# Patient Record
Sex: MALE | Race: WHITE | NOT HISPANIC OR LATINO | Employment: OTHER | URBAN - NONMETROPOLITAN AREA
[De-identification: names, ages, dates, MRNs, and addresses within clinical notes are randomized per-mention and may not be internally consistent; named-entity substitution may affect disease eponyms.]

---

## 2017-12-22 DIAGNOSIS — M25.561 PAIN IN BOTH KNEES, UNSPECIFIED CHRONICITY: Primary | ICD-10-CM

## 2017-12-22 DIAGNOSIS — M25.562 PAIN IN BOTH KNEES, UNSPECIFIED CHRONICITY: Primary | ICD-10-CM

## 2017-12-26 ENCOUNTER — OFFICE VISIT (OUTPATIENT)
Dept: ORTHOPEDIC SURGERY | Facility: CLINIC | Age: 77
End: 2017-12-26

## 2017-12-26 VITALS — WEIGHT: 214 LBS | HEIGHT: 60 IN | BODY MASS INDEX: 42.01 KG/M2

## 2017-12-26 DIAGNOSIS — M25.561 PAIN IN BOTH KNEES, UNSPECIFIED CHRONICITY: ICD-10-CM

## 2017-12-26 DIAGNOSIS — M25.562 PAIN IN BOTH KNEES, UNSPECIFIED CHRONICITY: ICD-10-CM

## 2017-12-26 DIAGNOSIS — M17.0 PRIMARY OSTEOARTHRITIS OF BOTH KNEES: Primary | ICD-10-CM

## 2017-12-26 PROCEDURE — 99203 OFFICE O/P NEW LOW 30 MIN: CPT | Performed by: NURSE PRACTITIONER

## 2017-12-26 PROCEDURE — 20610 DRAIN/INJ JOINT/BURSA W/O US: CPT | Performed by: NURSE PRACTITIONER

## 2017-12-26 RX ORDER — ASPIRIN 81 MG/1
81 TABLET ORAL DAILY
COMMUNITY

## 2017-12-26 RX ORDER — TRAZODONE HYDROCHLORIDE 50 MG/1
50 TABLET ORAL NIGHTLY
COMMUNITY
End: 2019-10-11 | Stop reason: ALTCHOICE

## 2017-12-26 RX ORDER — PRAVASTATIN SODIUM 40 MG
40 TABLET ORAL DAILY
COMMUNITY
End: 2018-10-10

## 2017-12-26 RX ORDER — AMIODARONE HYDROCHLORIDE 200 MG/1
200 TABLET ORAL DAILY
COMMUNITY

## 2017-12-26 RX ORDER — FUROSEMIDE 40 MG/1
40 TABLET ORAL 2 TIMES DAILY
COMMUNITY

## 2017-12-26 RX ORDER — MEMANTINE HYDROCHLORIDE 10 MG/1
10 TABLET ORAL 2 TIMES DAILY
COMMUNITY

## 2017-12-26 RX ORDER — POTASSIUM CHLORIDE 1.5 G/1.77G
20 POWDER, FOR SOLUTION ORAL 2 TIMES DAILY
COMMUNITY
End: 2018-10-10 | Stop reason: SDUPTHER

## 2017-12-26 RX ORDER — CARVEDILOL 12.5 MG/1
12.5 TABLET ORAL 2 TIMES DAILY WITH MEALS
COMMUNITY
End: 2018-10-10 | Stop reason: ALTCHOICE

## 2017-12-26 RX ORDER — BENAZEPRIL HYDROCHLORIDE 10 MG/1
10 TABLET ORAL DAILY
COMMUNITY

## 2017-12-26 RX ADMIN — TRIAMCINOLONE ACETONIDE 40 MG: 40 INJECTION, SUSPENSION INTRA-ARTICULAR; INTRAMUSCULAR at 15:14

## 2017-12-26 NOTE — PROGRESS NOTES
Duane Arnold is a 77 y.o. male   Primary provider:  Provider Not In System       Chief Complaint   Patient presents with   • Left Knee - Pain     Xray today   • Right Knee - Pain     Xray today       HISTORY OF PRESENT ILLNESS:    Pain   This is a chronic problem. Episode onset: years ago. The problem occurs constantly. Associated symptoms comments: aching. The symptoms are aggravated by walking and standing. Treatments tried: Cortisone shots,  Dr Christensen in [place]. The treatment provided mild relief.      Patient trying to establish care here, because his wife will be coming to [place] for rehab to be close to family        CONCURRENT MEDICAL HISTORY:    No past medical history on file.    Allergies   Allergen Reactions   • Aricept [Donepezil Hcl]          Current Outpatient Prescriptions:   •  amiodarone (PACERONE) 200 MG tablet, Take 200 mg by mouth Daily., Disp: , Rfl:   •  aspirin 81 MG EC tablet, Take 81 mg by mouth Daily., Disp: , Rfl:   •  benazepril (LOTENSIN) 10 MG tablet, Take 10 mg by mouth Daily., Disp: , Rfl:   •  carvedilol (COREG) 12.5 MG tablet, Take 12.5 mg by mouth 2 (Two) Times a Day With Meals., Disp: , Rfl:   •  furosemide (LASIX) 40 MG tablet, Take 40 mg by mouth 2 (Two) Times a Day., Disp: , Rfl:   •  memantine (NAMENDA) 10 MG tablet, Take 10 mg by mouth 2 (Two) Times a Day., Disp: , Rfl:   •  potassium chloride (KLOR-CON) 20 MEQ packet, Take 20 mEq by mouth 2 (Two) Times a Day., Disp: , Rfl:   •  pravastatin (PRAVACHOL) 40 MG tablet, Take 40 mg by mouth Daily., Disp: , Rfl:   •  traZODone (DESYREL) 50 MG tablet, Take 50 mg by mouth Every Night., Disp: , Rfl:     No past surgical history on file.    No family history on file.    Social History     Social History   • Marital status:      Spouse name: N/A   • Number of children: N/A   • Years of education: N/A     Occupational History   • Not on file.     Social History Main Topics   • Smoking status: Not on file   • Smokeless  "tobacco: Not on file   • Alcohol use Not on file   • Drug use: Not on file   • Sexual activity: Not on file     Other Topics Concern   • Not on file     Social History Narrative        Review of Systems   Constitutional: Negative.    HENT: Negative.    Eyes: Negative.    Respiratory: Negative.    Cardiovascular: Negative.    Gastrointestinal: Negative.    Endocrine: Negative.    Genitourinary: Negative.    Musculoskeletal: Negative.    Skin: Negative.    Allergic/Immunologic: Negative.    Neurological: Negative.    Hematological: Negative.    Psychiatric/Behavioral: Negative.        PHYSICAL EXAMINATION:       Ht 70 cm (27.56\")  Wt 97.1 kg (214 lb)  .1 kg/m2    Physical Exam   Constitutional: He is oriented to person, place, and time. Vital signs are normal. He appears well-developed and well-nourished. He is cooperative.   HENT:   Head: Normocephalic and atraumatic.   Neck: Trachea normal and phonation normal.   Pulmonary/Chest: Effort normal. No respiratory distress.   Abdominal: Soft. Normal appearance. He exhibits no distension.   Musculoskeletal:        Right knee: He exhibits no effusion.        Left knee: He exhibits no effusion.   Neurological: He is alert and oriented to person, place, and time. GCS eye subscore is 4. GCS verbal subscore is 5. GCS motor subscore is 6.   Skin: Skin is warm, dry and intact.   Psychiatric: He has a normal mood and affect. His speech is normal and behavior is normal. Judgment and thought content normal. Cognition and memory are normal.   Vitals reviewed.      GAIT:     []  Normal  [x]  Antalgic    Assistive device: []  None  []  Walker     []  Crutches  []  Cane     []  Wheelchair  []  Stretcher    Right Knee Exam     Tenderness   The patient is experiencing tenderness in the medial joint line.    Range of Motion   Extension: normal   Flexion: abnormal     Other   Erythema: absent  Scars: present  Sensation: normal  Pulse: present  Swelling: mild  Other tests: no " effusion present      Left Knee Exam     Tenderness   The patient is experiencing tenderness in the medial joint line.    Range of Motion   Extension: normal   Flexion: abnormal     Other   Erythema: absent  Scars: present  Sensation: normal  Pulse: present  Swelling: mild  Effusion: no effusion present                  Xr Knee Bilateral Ap Standing    Result Date: 12/26/2017  Narrative: Standing AP of both knees with lateral views of both reveals bilateral end-stage tricompartmental degenerative changes above knees without any acute radiological abnormality noted. 12/26/17 at 2:55 PM by SHARLA Montague    Xr Knee 1 Or 2 View Bilateral    Result Date: 12/26/2017  Narrative: Standing AP of both knees with lateral views of both reveals bilateral end-stage tricompartmental degenerative changes above knees without any acute radiological abnormality noted. 12/26/17 at 2:55 PM by SHARLA Montague           ASSESSMENT:    Diagnoses and all orders for this visit:    Primary osteoarthritis of both knees    Pain in both knees, unspecified chronicity  -     Large Joint Arthrocentesis  -     Large Joint Arthrocentesis    Other orders  -     furosemide (LASIX) 40 MG tablet; Take 40 mg by mouth 2 (Two) Times a Day.  -     potassium chloride (KLOR-CON) 20 MEQ packet; Take 20 mEq by mouth 2 (Two) Times a Day.  -     benazepril (LOTENSIN) 10 MG tablet; Take 10 mg by mouth Daily.  -     memantine (NAMENDA) 10 MG tablet; Take 10 mg by mouth 2 (Two) Times a Day.  -     pravastatin (PRAVACHOL) 40 MG tablet; Take 40 mg by mouth Daily.  -     carvedilol (COREG) 12.5 MG tablet; Take 12.5 mg by mouth 2 (Two) Times a Day With Meals.  -     amiodarone (PACERONE) 200 MG tablet; Take 200 mg by mouth Daily.  -     aspirin 81 MG EC tablet; Take 81 mg by mouth Daily.  -     traZODone (DESYREL) 50 MG tablet; Take 50 mg by mouth Every Night.      Large Joint Arthrocentesis  Date/Time: 12/26/2017 3:14 PM  Consent given by:  patient  Site marked: site marked  Timeout: Immediately prior to procedure a time out was called to verify the correct patient, procedure, equipment, support staff and site/side marked as required   Supporting Documentation  Indications: pain   Procedure Details  Location: knee - L knee  Preparation: Patient was prepped and draped in the usual sterile fashion  Needle size: 22 G  Approach: anteromedial  Medications administered: 40 mg triamcinolone acetonide 40 MG/ML (2cc  2% carbocaine  NDC 92589-840-64)  Patient tolerance: patient tolerated the procedure well with no immediate complications    Large Joint Arthrocentesis  Date/Time: 12/26/2017 3:14 PM  Consent given by: patient  Site marked: site marked  Timeout: Immediately prior to procedure a time out was called to verify the correct patient, procedure, equipment, support staff and site/side marked as required   Supporting Documentation  Indications: pain   Procedure Details  Location: knee - R knee  Preparation: Patient was prepped and draped in the usual sterile fashion  Needle size: 22 G  Approach: anteromedial  Medications administered: 40 mg triamcinolone acetonide 40 MG/ML (2cc  2% carbocaine  NDC 62033-373-30)  Patient tolerance: patient tolerated the procedure well with no immediate complications            PLAN  Discussed the findings and the x-rays with the daughter and the patient in detail.  We'll recommend intra-articular injections of steroids today continued use of the cane for pain exacerbations and a course of Visco supplementation follow-up in the next couple weeks.  No Follow-up on file.    Ananda Villarreal, SHARLA

## 2017-12-27 RX ORDER — TRIAMCINOLONE ACETONIDE 40 MG/ML
40 INJECTION, SUSPENSION INTRA-ARTICULAR; INTRAMUSCULAR
Status: COMPLETED | OUTPATIENT
Start: 2017-12-26 | End: 2017-12-26

## 2018-01-05 ENCOUNTER — CLINICAL SUPPORT (OUTPATIENT)
Dept: ORTHOPEDIC SURGERY | Facility: CLINIC | Age: 78
End: 2018-01-05

## 2018-01-05 VITALS — HEIGHT: 70 IN | WEIGHT: 214 LBS | BODY MASS INDEX: 30.64 KG/M2

## 2018-01-05 DIAGNOSIS — M25.562 ACUTE PAIN OF BOTH KNEES: ICD-10-CM

## 2018-01-05 DIAGNOSIS — M17.0 PRIMARY OSTEOARTHRITIS OF BOTH KNEES: Primary | ICD-10-CM

## 2018-01-05 DIAGNOSIS — M17.0 BILATERAL PRIMARY OSTEOARTHRITIS OF KNEE: ICD-10-CM

## 2018-01-05 DIAGNOSIS — M25.561 ACUTE PAIN OF BOTH KNEES: ICD-10-CM

## 2018-01-05 PROCEDURE — 20610 DRAIN/INJ JOINT/BURSA W/O US: CPT | Performed by: NURSE PRACTITIONER

## 2018-01-05 NOTE — PROGRESS NOTES
"Knee Joint Injection      Patient: Duane Arnold    YOB: 1940    Chief Complaint   Patient presents with   • Left Knee - Injections   • Right Knee - Injections       History of Present Illness:  Patient is here for ORTHOVISC #1 injections- bilateral knee.  No new complaints.    Physical Exam: 78 y.o. male  General Appearance:    Alert, cooperative, in no acute distress                   Vitals:    01/05/18 0842   Weight: 97.1 kg (214 lb)   Height: 177.8 cm (70\")   PainSc: 4  Comment: bilateral knee   PainLoc: Knee        Patient is alert and oriented, ×3 no acute distress.  Affect is normal respiratory rate is normal unlabored.  Exam and complaints are unchanged.    Procedure:  Large Joint Arthrocentesis  Date/Time: 1/5/2018 8:44 AM  Consent given by: patient  Timeout: Immediately prior to procedure a time out was called to verify the correct patient, procedure, equipment, support staff and site/side marked as required   Supporting Documentation  Indications: pain   Procedure Details  Location: knee - R knee  Medications administered: 30 mg Hyaluronan 30 MG/2ML  Patient tolerance: patient tolerated the procedure well with no immediate complications    Large Joint Arthrocentesis  Date/Time: 1/5/2018 8:44 AM  Consent given by: patient  Timeout: Immediately prior to procedure a time out was called to verify the correct patient, procedure, equipment, support staff and site/side marked as required   Supporting Documentation  Indications: pain   Procedure Details  Location: knee - L knee  Medications administered: 30 mg Hyaluronan 30 MG/2ML  Patient tolerance: patient tolerated the procedure well with no immediate complications          Assessment:    Diagnoses and all orders for this visit:    Primary osteoarthritis of both knees  -     Large Joint Arthrocentesis  -     Large Joint Arthrocentesis    Acute pain of both knees    Bilateral primary osteoarthritis of knee        Plan:   Slowly increase " activity as tolerated.  Discussed importance of leg strengthening and general conditioning.  Discussed warning signs of injection.  Discussed that purpose of injections was symptom improvement and improved activity.  Also discussed that further treatment options depended on symptoms at followup and length of time of improvement after injections.    No Follow-up on file.    Ananda Villarreal, APRN

## 2018-01-19 ENCOUNTER — CLINICAL SUPPORT (OUTPATIENT)
Dept: ORTHOPEDIC SURGERY | Facility: CLINIC | Age: 78
End: 2018-01-19

## 2018-01-19 DIAGNOSIS — M25.561 ACUTE PAIN OF BOTH KNEES: ICD-10-CM

## 2018-01-19 DIAGNOSIS — M17.0 PRIMARY OSTEOARTHRITIS OF BOTH KNEES: Primary | ICD-10-CM

## 2018-01-19 DIAGNOSIS — M25.562 ACUTE PAIN OF BOTH KNEES: ICD-10-CM

## 2018-01-19 PROCEDURE — 20610 DRAIN/INJ JOINT/BURSA W/O US: CPT | Performed by: NURSE PRACTITIONER

## 2018-01-19 NOTE — PROGRESS NOTES
Knee Joint Injection      Patient: Duane Arnold    YOB: 1940    Chief Complaint   Patient presents with   • Right Knee - Follow-up   • Left Knee - Follow-up   • Injections     orthovisc injection #2       History of Present Illness:  Patient is here for an injection.  No new complaints.    Physical Exam: 78 y.o. male  General Appearance:    Alert, cooperative, in no acute distress                   There were no vitals filed for this visit.     Patient is alert and oriented, ×3 no acute distress.  Affect is normal respiratory rate is normal unlabored.  Exam and complaints are unchanged.    Procedure:  Large Joint Arthrocentesis  Date/Time: 1/19/2018 1:00 PM  Consent given by: patient  Site marked: site marked  Timeout: Immediately prior to procedure a time out was called to verify the correct patient, procedure, equipment, support staff and site/side marked as required   Supporting Documentation  Indications: pain   Procedure Details  Location: knee - R knee  Preparation: Patient was prepped and draped in the usual sterile fashion  Needle size: 22 G  Approach: anteromedial  Medications administered: 30 mg Hyaluronan 30 MG/2ML  Patient tolerance: patient tolerated the procedure well with no immediate complications    Large Joint Arthrocentesis  Date/Time: 1/19/2018 1:00 PM  Consent given by: patient  Site marked: site marked  Timeout: Immediately prior to procedure a time out was called to verify the correct patient, procedure, equipment, support staff and site/side marked as required   Supporting Documentation  Indications: pain   Procedure Details  Location: knee - L knee  Needle size: 22 G  Approach: lateral  Medications administered: 30 mg Hyaluronan 30 MG/2ML  Patient tolerance: patient tolerated the procedure well with no immediate complications          Assessment:    Diagnoses and all orders for this visit:    Primary osteoarthritis of both knees  -     Large Joint Arthrocentesis  -      Large Joint Arthrocentesis    Acute pain of both knees  -     Large Joint Arthrocentesis  -     Large Joint Arthrocentesis        Plan:   Slowly increase activity as tolerated.  Discussed importance of leg strengthening and general conditioning.  Discussed warning signs of injection.  Discussed that purpose of injections was symptom improvement and improved activity.  Also discussed that further treatment options depended on symptoms at followup and length of time of improvement after injections.    No Follow-up on file.    Ananda Villarreal, APRN

## 2018-01-26 ENCOUNTER — CLINICAL SUPPORT (OUTPATIENT)
Dept: ORTHOPEDIC SURGERY | Facility: CLINIC | Age: 78
End: 2018-01-26

## 2018-01-26 VITALS — HEIGHT: 70 IN | WEIGHT: 205 LBS | BODY MASS INDEX: 29.35 KG/M2

## 2018-01-26 DIAGNOSIS — M17.0 BILATERAL PRIMARY OSTEOARTHRITIS OF KNEE: Primary | ICD-10-CM

## 2018-01-26 PROCEDURE — 20610 DRAIN/INJ JOINT/BURSA W/O US: CPT | Performed by: NURSE PRACTITIONER

## 2018-01-26 NOTE — PROGRESS NOTES
"Knee Joint Injection      Patient: Duane Arnold    YOB: 1940    Chief Complaint   Patient presents with   • Left Knee - Injections   • Right Knee - Injections       History of Present Illness:  Patient is here for Orthovisc- bilateral knee injections.  No new complaints.    Physical Exam: 78 y.o. male  General Appearance:    Alert, cooperative, in no acute distress                   Vitals:    01/26/18 1250   Weight: 93 kg (205 lb)   Height: 177.8 cm (70\")   PainSc: 4  Comment: bilateral   PainLoc: Knee        Patient is alert and oriented, ×3 no acute distress.  Affect is normal respiratory rate is normal unlabored.  Exam and complaints are unchanged.    Procedure:  Large Joint Arthrocentesis  Date/Time: 1/26/2018 12:55 PM  Consent given by: patient  Timeout: Immediately prior to procedure a time out was called to verify the correct patient, procedure, equipment, support staff and site/side marked as required   Supporting Documentation  Indications: pain   Procedure Details  Location: knee - R knee  Needle size: 22 G  Medications administered: 30 mg Hyaluronan 30 MG/2ML  Patient tolerance: patient tolerated the procedure well with no immediate complications    Large Joint Arthrocentesis  Date/Time: 1/26/2018 12:56 PM  Consent given by: patient  Timeout: Immediately prior to procedure a time out was called to verify the correct patient, procedure, equipment, support staff and site/side marked as required   Supporting Documentation  Indications: pain   Procedure Details  Location: knee - L knee  Needle size: 22 G  Medications administered: 30 mg Hyaluronan 30 MG/2ML  Patient tolerance: patient tolerated the procedure well with no immediate complications          Assessment:    Diagnoses and all orders for this visit:    Bilateral primary osteoarthritis of knee  -     Large Joint Arthrocentesis  -     Large Joint Arthrocentesis    Other orders  -     Cancel: Large Joint Arthrocentesis  -     Cancel: " Large Joint Arthrocentesis        Plan:   Slowly increase activity as tolerated.  Discussed importance of leg strengthening and general conditioning.  Discussed warning signs of injection.  Discussed that purpose of injections was symptom improvement and improved activity.  Also discussed that further treatment options depended on symptoms at followup and length of time of improvement after injections.    No Follow-up on file.    Ananda Villarreal, APRN

## 2018-02-02 ENCOUNTER — CLINICAL SUPPORT (OUTPATIENT)
Dept: ORTHOPEDIC SURGERY | Facility: CLINIC | Age: 78
End: 2018-02-02

## 2018-02-02 DIAGNOSIS — M25.562 ACUTE PAIN OF BOTH KNEES: ICD-10-CM

## 2018-02-02 DIAGNOSIS — M25.561 ACUTE PAIN OF BOTH KNEES: ICD-10-CM

## 2018-02-02 DIAGNOSIS — M17.0 BILATERAL PRIMARY OSTEOARTHRITIS OF KNEE: Primary | ICD-10-CM

## 2018-02-02 PROCEDURE — 20610 DRAIN/INJ JOINT/BURSA W/O US: CPT | Performed by: NURSE PRACTITIONER

## 2018-02-02 NOTE — PROGRESS NOTES
Knee Joint Injection      Patient: Duane Arnold    YOB: 1940    Chief Complaint   Patient presents with   • Left Knee - Injections   • Right Knee - Injections   • Injections     Orthovisc #4       History of Present Illness:  Patient is here for an injection.  No new complaints.    Physical Exam: 78 y.o. male  General Appearance:    Alert, cooperative, in no acute distress                   There were no vitals filed for this visit.     Patient is alert and oriented, ×3 no acute distress.  Affect is normal respiratory rate is normal unlabored.  Exam and complaints are unchanged.    Procedure:  Large Joint Arthrocentesis  Date/Time: 2/2/2018 12:55 PM  Consent given by: patient  Site marked: site marked  Timeout: Immediately prior to procedure a time out was called to verify the correct patient, procedure, equipment, support staff and site/side marked as required   Supporting Documentation  Indications: pain   Procedure Details  Location: knee - R knee  Preparation: Patient was prepped and draped in the usual sterile fashion  Needle size: 22 G  Approach: anteromedial  Medications administered: 30 mg Hyaluronan 30 MG/2ML  Patient tolerance: patient tolerated the procedure well with no immediate complications    Large Joint Arthrocentesis  Date/Time: 2/2/2018 12:55 PM  Consent given by: patient  Site marked: site marked  Timeout: Immediately prior to procedure a time out was called to verify the correct patient, procedure, equipment, support staff and site/side marked as required   Supporting Documentation  Indications: pain   Procedure Details  Location: knee - L knee  Preparation: Patient was prepped and draped in the usual sterile fashion  Needle size: 22 G  Approach: lateral  Medications administered: 30 mg Hyaluronan 30 MG/2ML  Patient tolerance: patient tolerated the procedure well with no immediate complications          Assessment:    Diagnoses and all orders for this visit:    Bilateral  primary osteoarthritis of knee  -     Large Joint Arthrocentesis  -     Large Joint Arthrocentesis  -     Ambulatory Referral to Physical Therapy Evaluate and treat    Acute pain of both knees  -     Large Joint Arthrocentesis  -     Large Joint Arthrocentesis  -     Ambulatory Referral to Physical Therapy Evaluate and treat        Plan:   Slowly increase activity as tolerated.  Discussed importance of leg strengthening and general conditioning.  Discussed warning signs of injection.  Discussed that purpose of injections was symptom improvement and improved activity.  Also discussed that further treatment options depended on symptoms at followup and length of time of improvement after injections.    No Follow-up on file.    Ananda Villarreal, APRN

## 2018-03-16 ENCOUNTER — OFFICE VISIT (OUTPATIENT)
Dept: ORTHOPEDIC SURGERY | Facility: CLINIC | Age: 78
End: 2018-03-16

## 2018-03-16 VITALS — WEIGHT: 197 LBS | HEIGHT: 70 IN | BODY MASS INDEX: 28.2 KG/M2

## 2018-03-16 DIAGNOSIS — M25.562 ACUTE PAIN OF BOTH KNEES: ICD-10-CM

## 2018-03-16 DIAGNOSIS — M25.561 ACUTE PAIN OF BOTH KNEES: ICD-10-CM

## 2018-03-16 DIAGNOSIS — M17.0 PRIMARY OSTEOARTHRITIS OF BOTH KNEES: Primary | ICD-10-CM

## 2018-03-16 PROCEDURE — 99213 OFFICE O/P EST LOW 20 MIN: CPT | Performed by: NURSE PRACTITIONER

## 2018-03-16 NOTE — PROGRESS NOTES
"Duane Arnold is a 78 y.o. male returns for     Chief Complaint   Patient presents with   • Left Knee - Follow-up   • Right Knee - Follow-up       HISTORY OF PRESENT ILLNESS: patient completed series of orthovisc injections on 2/2/2018 has helped. Left knee pain is worse than right.        CONCURRENT MEDICAL HISTORY:    No past medical history on file.    Allergies   Allergen Reactions   • Aricept [Donepezil Hcl]          Current Outpatient Prescriptions:   •  amiodarone (PACERONE) 200 MG tablet, Take 200 mg by mouth Daily., Disp: , Rfl:   •  aspirin 81 MG EC tablet, Take 81 mg by mouth Daily., Disp: , Rfl:   •  benazepril (LOTENSIN) 10 MG tablet, Take 10 mg by mouth Daily., Disp: , Rfl:   •  carvedilol (COREG) 12.5 MG tablet, Take 12.5 mg by mouth 2 (Two) Times a Day With Meals., Disp: , Rfl:   •  furosemide (LASIX) 40 MG tablet, Take 40 mg by mouth 2 (Two) Times a Day., Disp: , Rfl:   •  memantine (NAMENDA) 10 MG tablet, Take 10 mg by mouth 2 (Two) Times a Day., Disp: , Rfl:   •  potassium chloride (KLOR-CON) 20 MEQ packet, Take 20 mEq by mouth 2 (Two) Times a Day., Disp: , Rfl:   •  pravastatin (PRAVACHOL) 40 MG tablet, Take 40 mg by mouth Daily., Disp: , Rfl:   •  traZODone (DESYREL) 50 MG tablet, Take 50 mg by mouth Every Night., Disp: , Rfl:     No past surgical history on file.    ROS  No fevers or chills.  No chest pain or shortness of air.  No GI or  disturbances.    PHYSICAL EXAMINATION:       Ht 177.8 cm (70\")   Wt 89.4 kg (197 lb)   BMI 28.27 kg/m²     Physical Exam   Constitutional: He is oriented to person, place, and time. Vital signs are normal. He appears well-developed and well-nourished. He is cooperative.   HENT:   Head: Normocephalic and atraumatic.   Neck: Trachea normal and phonation normal.   Pulmonary/Chest: Effort normal. No respiratory distress.   Abdominal: Soft. Normal appearance. He exhibits no distension.   Musculoskeletal:        Right knee: He exhibits no effusion.        Left " knee: He exhibits no effusion.   Neurological: He is alert and oriented to person, place, and time. GCS eye subscore is 4. GCS verbal subscore is 5. GCS motor subscore is 6.   Skin: Skin is warm, dry and intact.   Psychiatric: He has a normal mood and affect. His speech is normal and behavior is normal. Judgment and thought content normal. Cognition and memory are normal.   Vitals reviewed.      GAIT:     []  Normal  []  Antalgic    Assistive device: []  None  []  Walker     []  Crutches  [x]  Cane     []  Wheelchair  []  Stretcher    Right Knee Exam     Tenderness   The patient is experiencing tenderness in the medial joint line.    Range of Motion   Extension: normal   Flexion: abnormal     Other   Erythema: absent  Scars: present  Sensation: normal  Pulse: present  Swelling: mild  Other tests: no effusion present      Left Knee Exam     Tenderness   The patient is experiencing tenderness in the medial joint line.    Range of Motion   Extension: normal   Flexion: abnormal     Other   Erythema: absent  Scars: present  Sensation: normal  Pulse: present  Swelling: mild  Effusion: no effusion present              No results found.          ASSESSMENT:    Diagnoses and all orders for this visit:    Primary osteoarthritis of both knees    Acute pain of both knees          PLAN  Pain and motion has improved I recommend continued home exercises, modified weightbearing and follow-up as needed for exacerbations of pain.  No Follow-up on file.    Ananda Villarreal, APRN

## 2018-10-10 ENCOUNTER — OFFICE VISIT (OUTPATIENT)
Dept: NEUROLOGY | Facility: CLINIC | Age: 78
End: 2018-10-10

## 2018-10-10 ENCOUNTER — APPOINTMENT (OUTPATIENT)
Dept: LAB | Facility: HOSPITAL | Age: 78
End: 2018-10-10

## 2018-10-10 VITALS
WEIGHT: 197 LBS | SYSTOLIC BLOOD PRESSURE: 118 MMHG | BODY MASS INDEX: 28.2 KG/M2 | HEIGHT: 70 IN | DIASTOLIC BLOOD PRESSURE: 68 MMHG

## 2018-10-10 DIAGNOSIS — F02.818 LATE ONSET ALZHEIMER'S DISEASE WITH BEHAVIORAL DISTURBANCE (HCC): Primary | ICD-10-CM

## 2018-10-10 DIAGNOSIS — G30.1 LATE ONSET ALZHEIMER'S DISEASE WITH BEHAVIORAL DISTURBANCE (HCC): Primary | ICD-10-CM

## 2018-10-10 LAB
ALBUMIN SERPL-MCNC: 4.3 G/DL (ref 3.2–4.8)
ALBUMIN/GLOB SERPL: 2 G/DL (ref 1.5–2.5)
ALP SERPL-CCNC: 55 U/L (ref 25–100)
ALT SERPL W P-5'-P-CCNC: 13 U/L (ref 7–40)
ANION GAP SERPL CALCULATED.3IONS-SCNC: 9 MMOL/L (ref 3–11)
AST SERPL-CCNC: 19 U/L (ref 0–33)
BILIRUB SERPL-MCNC: 0.5 MG/DL (ref 0.3–1.2)
BUN BLD-MCNC: 18 MG/DL (ref 9–23)
BUN/CREAT SERPL: 15.7 (ref 7–25)
CALCIUM SPEC-SCNC: 9.4 MG/DL (ref 8.7–10.4)
CHLORIDE SERPL-SCNC: 101 MMOL/L (ref 99–109)
CO2 SERPL-SCNC: 29 MMOL/L (ref 20–31)
CREAT BLD-MCNC: 1.15 MG/DL (ref 0.6–1.3)
DEPRECATED RDW RBC AUTO: 46.2 FL (ref 37–54)
ERYTHROCYTE [DISTWIDTH] IN BLOOD BY AUTOMATED COUNT: 13.3 % (ref 11.3–14.5)
FOLATE SERPL-MCNC: 20.05 NG/ML (ref 3.2–20)
GFR SERPL CREATININE-BSD FRML MDRD: 61 ML/MIN/1.73
GLOBULIN UR ELPH-MCNC: 2.1 GM/DL
GLUCOSE BLD-MCNC: 94 MG/DL (ref 70–100)
HCT VFR BLD AUTO: 43 % (ref 38.9–50.9)
HGB BLD-MCNC: 13.7 G/DL (ref 13.1–17.5)
MCH RBC QN AUTO: 30.4 PG (ref 27–31)
MCHC RBC AUTO-ENTMCNC: 31.9 G/DL (ref 32–36)
MCV RBC AUTO: 95.3 FL (ref 80–99)
PLATELET # BLD AUTO: 284 10*3/MM3 (ref 150–450)
PMV BLD AUTO: 9.3 FL (ref 6–12)
POTASSIUM BLD-SCNC: 3.8 MMOL/L (ref 3.5–5.5)
PROT SERPL-MCNC: 6.4 G/DL (ref 5.7–8.2)
RBC # BLD AUTO: 4.51 10*6/MM3 (ref 4.2–5.76)
SODIUM BLD-SCNC: 139 MMOL/L (ref 132–146)
TSH SERPL DL<=0.05 MIU/L-ACNC: 2.33 MIU/ML (ref 0.35–5.35)
VIT B12 BLD-MCNC: 386 PG/ML (ref 211–911)
WBC NRBC COR # BLD: 6.65 10*3/MM3 (ref 3.5–10.8)

## 2018-10-10 PROCEDURE — 82746 ASSAY OF FOLIC ACID SERUM: CPT | Performed by: PSYCHIATRY & NEUROLOGY

## 2018-10-10 PROCEDURE — 80053 COMPREHEN METABOLIC PANEL: CPT | Performed by: PSYCHIATRY & NEUROLOGY

## 2018-10-10 PROCEDURE — 82607 VITAMIN B-12: CPT | Performed by: PSYCHIATRY & NEUROLOGY

## 2018-10-10 PROCEDURE — 85027 COMPLETE CBC AUTOMATED: CPT | Performed by: PSYCHIATRY & NEUROLOGY

## 2018-10-10 PROCEDURE — 99205 OFFICE O/P NEW HI 60 MIN: CPT | Performed by: PSYCHIATRY & NEUROLOGY

## 2018-10-10 PROCEDURE — 36415 COLL VENOUS BLD VENIPUNCTURE: CPT | Performed by: PSYCHIATRY & NEUROLOGY

## 2018-10-10 PROCEDURE — 84443 ASSAY THYROID STIM HORMONE: CPT | Performed by: PSYCHIATRY & NEUROLOGY

## 2018-10-10 RX ORDER — RANITIDINE 150 MG/1
150 TABLET ORAL 2 TIMES DAILY
COMMUNITY

## 2018-10-10 RX ORDER — MIRTAZAPINE 15 MG/1
15 TABLET, FILM COATED ORAL NIGHTLY
Qty: 30 TABLET | Refills: 6 | Status: SHIPPED | OUTPATIENT
Start: 2018-10-10 | End: 2019-05-28 | Stop reason: SDUPTHER

## 2018-10-10 RX ORDER — DONEPEZIL HYDROCHLORIDE 5 MG/1
5 TABLET, FILM COATED ORAL DAILY
Refills: 5 | COMMUNITY
Start: 2018-09-26 | End: 2018-10-10 | Stop reason: SDUPTHER

## 2018-10-10 RX ORDER — POTASSIUM CHLORIDE 20 MEQ/1
TABLET, EXTENDED RELEASE ORAL
Refills: 3 | COMMUNITY
Start: 2018-07-16

## 2018-10-10 RX ORDER — MONTELUKAST SODIUM 4 MG/1
TABLET, CHEWABLE ORAL
Refills: 3 | COMMUNITY
Start: 2018-09-28 | End: 2018-10-10

## 2018-10-10 NOTE — PROGRESS NOTES
"Subjective     CC: Memory Loss (NP)    History of Present Illness     Duane Arnold is a 78 y.o. male who comes to clinic today for evaluation of memory impairment. His family has noted symptoms since approximately 2012 marked initially by forgetfulness. This has gradually worsened  over time. Additional associated symptoms have included impairments in essentially all spheres of cognition. He has had associated  symptoms of delusions and hallucinations.      His wife noted benefit from donepezil initially, though this was stopped when memantine was started.     I have reviewed and confirmed the past family, social and medical history as accurate on 10/10/18.     Review of Systems   Unable to perform ROS: Dementia       Objective   General appearance today is normal.   Peripheral pulses were present and symmetric.   The ophthalmoscopic exam today is unremarkable. The discs and posterior elements are unremarkable.    /68   Ht 177.8 cm (70\")   Wt 89.4 kg (197 lb)   BMI 28.27 kg/m²     Physical Exam   Neurological: He has normal strength. He has a normal Finger-Nose-Finger Test. Gait normal.   Reflex Scores:       Tricep reflexes are 1+ on the right side and 1+ on the left side.       Bicep reflexes are 1+ on the right side and 1+ on the left side.       Brachioradialis reflexes are 1+ on the right side and 1+ on the left side.       Patellar reflexes are 1+ on the right side and 1+ on the left side.       Achilles reflexes are 1+ on the right side and 1+ on the left side.  Psychiatric: His speech is normal.        Neurologic Exam     Mental Status   Oriented to person.   Disoriented to place.   Disoriented to time.   Registration: recalls 3 of 3 objects. Recall of objects at 5 minutes: 0/3. Follows 3 step commands.   Attention: normal.   Speech: speech is normal   Level of consciousness: alert  Knowledge: good.   Unable to name object. Able to read. Unable to repeat. Unable to write. Normal comprehension. "     Cranial Nerves   Cranial nerves II through XII intact.     Motor Exam   Muscle bulk: normal  Overall muscle tone: normal    Strength   Strength 5/5 throughout.     Sensory Exam   Light touch normal.     Gait, Coordination, and Reflexes     Gait  Gait: normal    Coordination   Finger to nose coordination: normal    Reflexes   Right brachioradialis: 1+  Left brachioradialis: 1+  Right biceps: 1+  Left biceps: 1+  Right triceps: 1+  Left triceps: 1+  Right patellar: 1+  Left patellar: 1+  Right achilles: 1+  Left achilles: 1+      MMSE=10      Assessment/Plan   Duane was seen today for memory loss.    Diagnoses and all orders for this visit:    Late onset Alzheimer's disease with behavioral disturbance  -     CBC (No Diff)  -     Comprehensive Metabolic Panel  -     Vitamin B12  -     TSH  -     Folate  -     CT Head Without Contrast          DISCUSSION/SUMMARY    Duane Arnold comes to clinic today for evaluation of Dementia . His history and examination, including bedside cognitive testing are most consistent with advanced Alzheimer's Disease , which was discussed. For now it was elected to obtain screening blood work  and a head CT . I discussed multiple treatment options including restarting donepezil (his wife will check with his cardiologist first), and a trial of mirtazapine or Seroquel for delusions and hallucinations, along with potential risks and black box warning regarding neuroleptics. For now we'll try mirtazapine. He will then follow up in 3 months , or sooner if needed.     As part of this visit I obtained additional history from the family which is incorporated in the HPI. Please see above for additional details.

## 2018-11-19 ENCOUNTER — TELEPHONE (OUTPATIENT)
Dept: NEUROLOGY | Facility: CLINIC | Age: 78
End: 2018-11-19

## 2018-11-19 RX ORDER — QUETIAPINE FUMARATE 25 MG/1
25 TABLET, FILM COATED ORAL DAILY
Qty: 30 TABLET | Refills: 6 | Status: SHIPPED | OUTPATIENT
Start: 2018-11-19 | End: 2019-04-12

## 2018-11-26 ENCOUNTER — OFFICE VISIT CONVERTED (OUTPATIENT)
Dept: SURGERY | Facility: CLINIC | Age: 78
End: 2018-11-26
Attending: SURGERY

## 2018-12-27 ENCOUNTER — TELEPHONE (OUTPATIENT)
Dept: NEUROLOGY | Facility: CLINIC | Age: 78
End: 2018-12-27

## 2018-12-27 NOTE — TELEPHONE ENCOUNTER
Phone call with Mrs. Arnold to check in. He went into the hospital 12/1 for emergency surgery of small intestine being tangled, removed over 100cm, hospital for over a week then rehab. He is now home. Nursing home said they couldn't keep him because he needed 1:1 care. She has children helping as they can. He is receiving Nursing, PT/OT at home. He's cognitively worse, a lot of fluctuation which she udnerstands is to be expected after surgery, hospital stay, and moves to rehab and back home. He woke up last night confused, delusional saying he needed to go somewhere, stories that didn't make sense. She gave him a dose of prn Haldol which seemed to make it worse. I told her i'd ask  for other thoughts thought this reoccur. She and I will keep in touch on  His progress with home health and reevaluate his needs (in home care vs. Finding nursing home).

## 2018-12-27 NOTE — TELEPHONE ENCOUNTER
I'd double mirtazapine to 30 mg qhs and increase Seroquel to 25 mg bid. He could also take an extra 25 mg Seroquel prn if needed. Thanks.

## 2018-12-28 NOTE — TELEPHONE ENCOUNTER
Spoke with wife and gave her Dr. Bearden's recommendation on med adjustments, she wrote down and verbalized understanding. I asked again if I could arrange respite care and she said she got a name of a caregiver from the home health nurse and wanted advice on certifications a person should have, etc. I told her pros/cons of being with someone not affiliated with agency and to screen her using references she verbalized understanding.

## 2019-01-01 RX ORDER — MIRTAZAPINE 15 MG/1
TABLET, FILM COATED ORAL
Qty: 30 TABLET | Refills: 6 | Status: SHIPPED | OUTPATIENT
Start: 2019-01-01 | End: 2020-01-01

## 2019-01-25 ENCOUNTER — APPOINTMENT (OUTPATIENT)
Dept: CT IMAGING | Facility: HOSPITAL | Age: 79
End: 2019-01-25
Attending: PSYCHIATRY & NEUROLOGY

## 2019-04-12 ENCOUNTER — OFFICE VISIT (OUTPATIENT)
Dept: NEUROLOGY | Facility: CLINIC | Age: 79
End: 2019-04-12

## 2019-04-12 ENCOUNTER — HOSPITAL ENCOUNTER (OUTPATIENT)
Dept: CT IMAGING | Facility: HOSPITAL | Age: 79
Discharge: HOME | End: 2019-04-12
Admitting: PSYCHIATRY & NEUROLOGY

## 2019-04-12 VITALS — BODY MASS INDEX: 28.2 KG/M2 | HEIGHT: 70 IN | WEIGHT: 197 LBS

## 2019-04-12 DIAGNOSIS — G30.1 LATE ONSET ALZHEIMER'S DISEASE WITH BEHAVIORAL DISTURBANCE (HCC): Primary | ICD-10-CM

## 2019-04-12 DIAGNOSIS — F02.818 LATE ONSET ALZHEIMER'S DISEASE WITH BEHAVIORAL DISTURBANCE (HCC): Primary | ICD-10-CM

## 2019-04-12 PROCEDURE — 70450 CT HEAD/BRAIN W/O DYE: CPT

## 2019-04-12 PROCEDURE — 99214 OFFICE O/P EST MOD 30 MIN: CPT | Performed by: PSYCHIATRY & NEUROLOGY

## 2019-04-12 RX ORDER — DONEPEZIL HYDROCHLORIDE 5 MG/1
5 TABLET, FILM COATED ORAL DAILY
Qty: 30 TABLET | Refills: 11 | Status: SHIPPED | OUTPATIENT
Start: 2019-04-12 | End: 2019-10-11 | Stop reason: SDUPTHER

## 2019-04-12 NOTE — PROGRESS NOTES
"Duane Arnold    Subjective     CC: dementia    History of Present Illness   Duane Arnold returns to clinic today with a history of Alzheimer's Disease . His family has noted symptoms since approximately 2012 marked initially by forgetfulness. This has gradually worsened  over time. Additional symptoms have included impairments in essentially all spheres of cognition. He has had associated  symptoms of delusions and hallucinations.     Prior evaluation has included screening blood work  and a head CT  which were unremarkable . He took donepezil previously without problem, though one his PCP's stopped it in the past.    Since his last visit on 10/10/18, he has undergone surgery for an ischemic bowel in 12/18. This surgery was complicated by increased confusion, and suspected delirium. Much of this has improved since he returned home, but continues to have cognitive impairment along with significant associated aphasia. His hallucinations have improved, as has any lability. He is no longer on Seroquel, and his family reports that this medication, as well as Haldol during his hospitalization worsened his behavior.    I have reviewed and confirmed the past family, social and medical history as accurate on 4/12/19.     Review of Systems   Unable to perform ROS: Dementia       Objective     Ht 177.8 cm (70\")   Wt 89.4 kg (197 lb)   BMI 28.27 kg/m²      Neurologic Exam     Mental Status   Oriented to person.   Disoriented to place.   Disoriented to time.   Recall of objects at 5 minutes:  0/3. Follows 1 step commands.   Attention: normal.   Speech: speech is normal   Level of consciousness: alert  Knowledge: poor.   Unable to name object. Unable to read. Unable to repeat. Unable to write. Abnormal comprehension.     Cranial Nerves   Cranial nerves II through XII intact.     Motor Exam   Muscle bulk: normal  Overall muscle tone: normal    Strength   Strength 5/5 throughout.       MMSE=2      Assessment/Plan "   Diagnoses and all orders for this visit:    Late onset Alzheimer's disease with behavioral disturbance          Duane Arnold returns to clinic today with a history of Alzheimer's Disease . I again reviewed his current status and treatment options. After discussing potential treatment options, it was elected to continue on  memantine and add donepezil. I have also made a referral to Speech Pathology. I discussed his condition including aphasia and communication in some detail. He will then follow up in 6 months , or sooner if needed.       I spent 30  minutes face to face with the patient and family. I   spent 20 minutes of this time counseling and discussing diagnostic testing, evaluation, treatment options and management.    As part of this visit I reviewed prior lab results, reviewed radiology results and obtained additional history from the family which is incorporated in the HPI.    Jaleel Bearden MD

## 2019-05-28 RX ORDER — MIRTAZAPINE 15 MG/1
TABLET, FILM COATED ORAL
Qty: 30 TABLET | Refills: 6 | Status: SHIPPED | OUTPATIENT
Start: 2019-05-28 | End: 2019-01-01 | Stop reason: SDUPTHER

## 2019-10-11 ENCOUNTER — OFFICE VISIT (OUTPATIENT)
Dept: NEUROLOGY | Facility: CLINIC | Age: 79
End: 2019-10-11

## 2019-10-11 VITALS — HEIGHT: 70 IN | HEART RATE: 76 BPM | BODY MASS INDEX: 28.27 KG/M2 | OXYGEN SATURATION: 94 %

## 2019-10-11 DIAGNOSIS — G30.1 LATE ONSET ALZHEIMER'S DISEASE WITH BEHAVIORAL DISTURBANCE (HCC): Primary | ICD-10-CM

## 2019-10-11 DIAGNOSIS — F02.818 LATE ONSET ALZHEIMER'S DISEASE WITH BEHAVIORAL DISTURBANCE (HCC): Primary | ICD-10-CM

## 2019-10-11 PROCEDURE — 99214 OFFICE O/P EST MOD 30 MIN: CPT | Performed by: PSYCHIATRY & NEUROLOGY

## 2019-10-11 RX ORDER — RANITIDINE 300 MG/1
300 TABLET ORAL DAILY
COMMUNITY

## 2019-10-11 RX ORDER — NEOMYCIN SULFATE, POLYMYXIN B SULFATE AND DEXAMETHASONE 3.5; 10000; 1 MG/ML; [USP'U]/ML; MG/ML
SUSPENSION/ DROPS OPHTHALMIC
Refills: 0 | COMMUNITY
Start: 2019-08-06

## 2019-10-11 RX ORDER — SELENIUM 50 MCG
2 TABLET ORAL EVERY MORNING
Refills: 5 | COMMUNITY
Start: 2019-09-17

## 2019-10-11 RX ORDER — BENAZEPRIL HYDROCHLORIDE 5 MG/1
5 TABLET, FILM COATED ORAL DAILY
Refills: 3 | COMMUNITY
Start: 2019-08-06

## 2019-10-11 RX ORDER — POLYETHYLENE GLYCOL 3350 17 G/17G
17 POWDER, FOR SOLUTION ORAL
COMMUNITY

## 2019-10-11 RX ORDER — NICOTINE POLACRILEX 2 MG
1 LOZENGE BUCCAL DAILY
COMMUNITY

## 2019-10-11 RX ORDER — DONEPEZIL HYDROCHLORIDE 10 MG/1
10 TABLET, FILM COATED ORAL DAILY
Qty: 30 TABLET | Refills: 11 | Status: SHIPPED | OUTPATIENT
Start: 2019-10-11 | End: 2020-01-01

## 2019-10-11 NOTE — PROGRESS NOTES
"Duane Arnold    Subjective     CC: dementia    History of Present Illness   Duane Arnold returns to clinic today with a history of Alzheimer's Disease . His family has noted symptoms since approximately 2012 marked initially by forgetfulness. This has gradually worsened  over time. Additional symptoms have included impairments in essentially all spheres of cognition. He has had associated  symptoms of delusions and hallucinations.     Prior evaluation has included screening blood work  and a head CT  which were unremarkable . He took donepezil previously without problem, though one his PCP's stopped it in the past.    Since his last visit on 4/12/19 his wife feels that the addition of donepezil along with SLP have been helpful, particularly in terms of his language. His wife does report somniloquy.    I have reviewed and confirmed the past family, social and medical history as accurate on 10/11/19.     Review of Systems   Unable to perform ROS: Dementia       Objective     Pulse 76   Ht 177.8 cm (70\")   SpO2 94%   BMI 28.27 kg/m²      Neurologic Exam     Mental Status   Oriented to person.   Disoriented to place.   Disoriented to time.   Recall of objects at 5 minutes:  0/3. Follows 1 step commands.   Attention: normal.   Speech: speech is normal   Level of consciousness: alert  Unable to name object. Unable to read. Unable to repeat. Unable to write. Abnormal comprehension.     Cranial Nerves   Cranial nerves II through XII intact.     Motor Exam   Muscle bulk: normal  Overall muscle tone: normal    Strength   Strength 5/5 throughout.       MMSE=untestable      Assessment/Plan   Duane was seen today for alzheimer's disease and memory loss.    Diagnoses and all orders for this visit:    Late onset Alzheimer's disease with behavioral disturbance (CMS/HCC)          Duane Arnold returns to clinic today with a history of Alzheimer's Disease . I again reviewed his current status and treatment options. After " discussing potential treatment options, it was elected to continue on  memantine and increase donepezil to 10 mg dailiy. For somniloquy he will try melatonin. If that is not helpful we can try increasing mirtazapine in the futurel. He will then follow up in 6 months, or sooner if needed.       I spent 25 minutes face to face with the patient and family. I   spent 15 minutes of this time counseling and discussing evaluation, treatment options and management.    As part of this visit I obtained additional history from the family which is incorporated in the HPI.    Jaleel Bearden MD

## 2020-01-01 ENCOUNTER — OFFICE VISIT (OUTPATIENT)
Dept: NEUROLOGY | Facility: CLINIC | Age: 80
End: 2020-01-01

## 2020-01-01 ENCOUNTER — TELEPHONE (OUTPATIENT)
Dept: NEUROLOGY | Facility: CLINIC | Age: 80
End: 2020-01-01

## 2020-01-01 DIAGNOSIS — G30.1 LATE ONSET ALZHEIMER'S DISEASE WITH BEHAVIORAL DISTURBANCE (HCC): Primary | ICD-10-CM

## 2020-01-01 DIAGNOSIS — F02.818 LATE ONSET ALZHEIMER'S DISEASE WITH BEHAVIORAL DISTURBANCE (HCC): Primary | ICD-10-CM

## 2020-01-01 PROCEDURE — 99443 PR PHYS/QHP TELEPHONE EVALUATION 21-30 MIN: CPT | Performed by: PHYSICIAN ASSISTANT

## 2020-01-01 RX ORDER — DONEPEZIL HYDROCHLORIDE 10 MG/1
TABLET, FILM COATED ORAL
Qty: 30 TABLET | Refills: 11 | Status: SHIPPED | OUTPATIENT
Start: 2020-01-01

## 2020-01-01 RX ORDER — MIRTAZAPINE 30 MG/1
30 TABLET, FILM COATED ORAL NIGHTLY
Qty: 90 TABLET | Refills: 3 | Status: SHIPPED | OUTPATIENT
Start: 2020-01-01

## 2020-01-01 RX ORDER — ESCITALOPRAM OXALATE 5 MG/1
5 TABLET ORAL DAILY
Qty: 30 TABLET | Refills: 11 | Status: SHIPPED | OUTPATIENT
Start: 2020-01-01

## 2020-04-17 NOTE — PROGRESS NOTES
Subjective     Chief Complaint: dementia      History of Present Illness   Duane Arnold is a 80 y.o. male who returns to clinic today with a history of Alzheimer's Disease . His family has noted symptoms since approximately 2012 marked initially by forgetfulness. This has gradually worsened  over time. Additional symptoms have included impairments in essentially all spheres of cognition. He has had associated  symptoms of delusions and hallucinations.      Prior evaluation has included screening blood work and a head CT  which were unremarkable . He took donepezil previously without problem, though one his PCP's stopped it in the past.    Today: Since his last visit in 10/19, he feels essentially unchanged. His family has noted a continued cognitive decline as well as increased restlessness and trouble sleeping.       I have reviewed and confirmed the past family, social and medical history as accurate on 10/19.     Review of Systems   Unable to perform ROS: Dementia           Neurologic Exam     Mental Status   Oriented to person.   Disoriented to place.   Disoriented to time.   Speech: speech is normal   Level of consciousness: alert  Normal comprehension.         Results  MMSE=untestable       Assessment/Plan   Diagnoses and all orders for this visit:    Late onset Alzheimer's disease with behavioral disturbance (CMS/HCC)    Other orders  -     mirtazapine (REMERON) 30 MG tablet; Take 1 tablet by mouth Every Night.          Discussion/Summary   Duane Arnold returns to clinic today for evaluation of Alzheimer's Disease . I again reviewed his current status and treatment options. After discussing potential treatment options, it was elected to increase his mirtazapine to 30mg nightly and continue on donepezil and memantine unchanged. He will then follow up in 6 months , or sooner if needed.   I spent 25 minutes face to face with the patient and family with 20 minutes spent on discussing diagnosis, prognosis,  evaluation, current status, treatment options and management as discussed above.     This visit was performed over the phone with patient and family's consent.     As part of this visit I obtained additional history from the family which is incorporated in the HPI.      Yue Navas PA-C

## 2020-10-16 NOTE — PROGRESS NOTES
Subjective       Chief Complaint: dementia      History of Present Illness   Duane Arnold is a 80 y.o. male who returns to clinic today with a history of Alzheimer's Disease . His family has noted symptoms since approximately 2012 marked initially by forgetfulness. This has gradually worsened  over time. Additional symptoms have included impairments in essentially all spheres of cognition. He has had associated  symptoms of delusions and hallucinations.      Prior evaluation has included screening blood work and a head CT which were unremarkable. He is currently taking donepezil and memantine as well as mirtazapine 30mg nightly.     Today: Since his last visit in 4/20, his family has noted a continued decline in all spheres of cognition. His restlessness has improved with the recent mirtazapine to 30mg nightly.        I have reviewed and confirmed the past family, social and medical history as accurate on 10/16/2020.     Review of Systems   Unable to perform ROS: Dementia       Objective       Physical Exam  Psychiatric:         Speech: Speech normal.          Neurologic Exam     Mental Status   Speech: speech is normal   Level of consciousness: alert        Results  MMSE=untestable       Assessment/Plan   Diagnoses and all orders for this visit:    1. Late onset Alzheimer's disease with behavioral disturbance (CMS/HCC) (Primary)    Other orders  -     escitalopram (Lexapro) 5 MG tablet; Take 1 tablet by mouth Daily.  Dispense: 30 tablet; Refill: 11          Discussion/Summary   Duane Arnold returns to clinic today for evaluation of Alzheimer's Disease . I again reviewed his current status and treatment options. After discussing potential treatment options, it was elected to add Lexapro for his mood and continue on donepezil, memantine and mirtazapine unchanged. He will then follow up in 6 months , or sooner if needed.   I spent 21 minutes on the phone with the patient and family with 15 minutes spent on  discussing diagnosis, prognosis, evaluation, current status, treatment options and management as discussed above.     This visit was performed on the phone with patient's consent.     As part of this visit I obtained additional history from the family which is incorporated in the HPI.      Yue Navas PA-C

## 2020-10-29 NOTE — TELEPHONE ENCOUNTER
----- Message from Yue Navas PA-C sent at 10/16/2020  2:56 PM EDT -----  Would you care to call patient's wife when you get a chance? She would like some info on potential resources and support options (which may be limited at they live in [place]). Thanks!

## 2020-10-29 NOTE — TELEPHONE ENCOUNTER
Phone call with wife, Mrs. Arnold, to check in on she and Mr. Arnold and assess support needs. Unfortunately he fell last week and is in nursing home for rehab- twisted his leg, no surgery needed nor broken bones. She has 2 very involved daughters, one in [place], [place] and one in [place] who is a physician and helps greatly with medical advice. I asked her to talk with rehab staff and daughters closer to 90 day discharge to see the level of care that would be needed and if she can provide that at home or need additonal resources at home. If she needs help identifying those needs or community resources I asked her to call me, she verbalized appreciation and understanding.

## 2020-11-19 ENCOUNTER — TELEPHONE (OUTPATIENT)
Dept: NEUROLOGY | Facility: CLINIC | Age: 80
End: 2020-11-19

## 2020-11-19 NOTE — TELEPHONE ENCOUNTER
PT'S WIFE [place] CALLED TO LET DR. MAN AND SARAH CHRIS PA-C THAT THE PT HAD PASSED AWAY ON 11- AT THE NURSING HOME AND SHE WAS WITH HIM. THANK YOU FOR ALL YOU DID FOR HIM.

## 2021-05-16 VITALS — HEIGHT: 70 IN | BODY MASS INDEX: 24.91 KG/M2 | RESPIRATION RATE: 16 BRPM | WEIGHT: 174 LBS
